# Patient Record
Sex: FEMALE | Race: WHITE | NOT HISPANIC OR LATINO | Employment: FULL TIME | URBAN - METROPOLITAN AREA
[De-identification: names, ages, dates, MRNs, and addresses within clinical notes are randomized per-mention and may not be internally consistent; named-entity substitution may affect disease eponyms.]

---

## 2021-05-25 ENCOUNTER — RECORDS - HEALTHEAST (OUTPATIENT)
Dept: ADMINISTRATIVE | Facility: CLINIC | Age: 61
End: 2021-05-25

## 2021-05-27 ENCOUNTER — RECORDS - HEALTHEAST (OUTPATIENT)
Dept: ADMINISTRATIVE | Facility: CLINIC | Age: 61
End: 2021-05-27

## 2022-05-13 ENCOUNTER — HOSPITAL ENCOUNTER (EMERGENCY)
Facility: CLINIC | Age: 62
Discharge: HOME | End: 2022-05-13
Admitting: PHYSICIAN ASSISTANT
Payer: COMMERCIAL

## 2022-05-13 VITALS
OXYGEN SATURATION: 96 % | DIASTOLIC BLOOD PRESSURE: 91 MMHG | TEMPERATURE: 98.3 F | HEART RATE: 87 BPM | SYSTOLIC BLOOD PRESSURE: 137 MMHG | BODY MASS INDEX: 26.35 KG/M2 | WEIGHT: 154.32 LBS | HEIGHT: 64 IN | RESPIRATION RATE: 16 BRPM
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
## 2022-05-13 PROCEDURE — 99282 EMERGENCY DEPT VISIT SF MDM: CPT

## 2022-05-13 NOTE — DISCHARGE INSTRUCTIONS
Please take the antibiotics as written.  Please watch for worsening symptoms in the next 48 to 72 hours.  If you develop fever or spreading redness in this timeframe please consider returning to the ED.  It will take a couple of days for the antibiotics to kick in and to work.

## 2022-05-13 NOTE — ED PROVIDER NOTES
EMERGENCY DEPARTMENT ENCOUNTER      NAME: Vic Rodriguez  AGE: 62 year old female  YOB: 1960  MRN: [id number]  EVALUATION DATE & TIME: 5/13/2022  4:36 PM    PCP: No primary care provider on file.    ED PROVIDER: Mike Morrissey PA-C      Chief Complaint   Patient presents with     Dog Bite         FINAL IMPRESSION:  1. Dog bite          MEDICAL DECISION MAKING:    Pertinent Labs & Imaging studies reviewed. (See chart for details)  62 year old female presents to the Emergency Department for evaluation of dog bite to the left knee and quadriceps area.   After obtaining history present illness, reviewing vitals and examined the patient I did not feel that emergent work-up in the form of labs or imaging was necessary.  This seems to be all confined to the soft tissues of the the proximal left knee.  I do not feel that there is joint involvement.  We will start the patient on a course of Augmentin therapy.      Tetanus is within the last 10 years no need for emergent update at this time.  We discussed signs and symptoms for which to return.  Overall patient comfortable plan of care.    ED COURSE    I met with the patient, obtained history, performed an initial exam, and discussed options and plan for diagnostics and treatment here in the ED.    At the conclusion of the encounter I discussed the results of all of the tests and the disposition. The questions were answered. The patient or family acknowledged understanding and was agreeable with the care plan.     MEDICATIONS GIVEN IN THE EMERGENCY:  Medications - No data to display    NEW PRESCRIPTIONS STARTED AT TODAY'S ER VISIT  New Prescriptions    AMOXICILLIN-CLAVULANATE (AUGMENTIN) 875-125 MG TABLET    Take 1 tablet by mouth 2 times daily for 10 days            =================================================================    HPI    Patient information was obtained from: Patient  Vic Rodriguez is a 62 year old female who presents to this ED for  "evaluation of dog bite to the left knee.  Patient reports that the bite was caused by her own dog.  She states that last evening there was a dog off the leash that came running towards her dog and the dog's began to fight.  The patient tried to break up the fight and got between the 2 dogs and accidentally her own dog bit her in the left knee.  No other injuries.  She has cleaned the wound but otherwise no medications taken.  She was concerned with swelling and redness started to get worse around the bite wounds.  Patient is ambulatory under her own power.            PAST MEDICAL HISTORY:  No past medical history on file.    PAST SURGICAL HISTORY:  No past surgical history on file.      CURRENT MEDICATIONS:    No current facility-administered medications for this encounter.    Current Outpatient Medications:      amoxicillin-clavulanate (AUGMENTIN) 875-125 MG tablet, Take 1 tablet by mouth 2 times daily for 10 days, Disp: 20 tablet, Rfl: 0      ALLERGIES:  Allergies   Allergen Reactions     Prochlorperazine Other (See Comments)     shaky         FAMILY HISTORY:  No family history on file.    SOCIAL HISTORY:   Social History     Socioeconomic History     Marital status:        VITALS:  Patient Vitals for the past 24 hrs:   BP Temp Temp src Pulse Resp SpO2 Height Weight   05/13/22 1435 (!) 137/91 98.3  F (36.8  C) Oral 87 16 96 % 1.626 m (5' 4\") 70 kg (154 lb 5.2 oz)       PHYSICAL EXAM    Physical Exam  Vitals and nursing note reviewed.   Constitutional:       General: She is not in acute distress.     Appearance: She is normal weight. She is not toxic-appearing.   HENT:      Head: Normocephalic.   Eyes:      Conjunctiva/sclera: Conjunctivae normal.   Cardiovascular:      Pulses: Normal pulses.   Pulmonary:      Effort: Pulmonary effort is normal.   Abdominal:      General: Abdomen is flat. Bowel sounds are normal.   Musculoskeletal:         General: No tenderness or deformity. Normal range of motion.      " Right lower leg: No edema.      Comments: Patient able to fully flex and extend the left knee without severe pain.  No joint effusion noted.   Skin:     General: Skin is warm and dry.      Comments: 3-4 puncture wounds above the left knee.  No purulent drainage, no bleeding.  No fluctuance.  No significant lacerations that would require repair.  Very mild erythema only surrounding the bite wounds themselves 2 to 3 mm around the puncture areas but nothing else extensive and no streaking erythema up the leg.   Neurological:      General: No focal deficit present.      Mental Status: She is alert. Mental status is at baseline.      Sensory: No sensory deficit.      Motor: No weakness.   Psychiatric:         Mood and Affect: Mood normal.          LAB:  All pertinent labs reviewed and interpreted.       RADIOLOGY:  Reviewed all pertinent imaging. Please see official radiology report.  No orders to display         Mike Morrissey PA-C  Emergency Medicine  [facility]     Mike Morrissey PA-C  05/13/22 3359

## 2022-05-13 NOTE — ED TRIAGE NOTES
Patient reports being bitten by her own dog last evening 2030. Dog UTD on vaccinations. Pain, swelling, and redness have progressively worsened since time of bite. Patient Tdap 2013       Triage Assessment     Row Name 05/13/22 9006       Triage Assessment (Adult)    Airway WDL WDL       Respiratory WDL    Respiratory WDL WDL       Skin Circulation/Temperature WDL    Skin Circulation/Temperature WDL X  dog bite to left knee, redness, swelling. Pain.       Cardiac WDL    Cardiac WDL WDL       Peripheral/Neurovascular WDL    Peripheral Neurovascular WDL WDL       Cognitive/Neuro/Behavioral WDL    Cognitive/Neuro/Behavioral WDL WDL